# Patient Record
Sex: FEMALE | Race: WHITE | NOT HISPANIC OR LATINO | URBAN - METROPOLITAN AREA
[De-identification: names, ages, dates, MRNs, and addresses within clinical notes are randomized per-mention and may not be internally consistent; named-entity substitution may affect disease eponyms.]

---

## 2017-01-24 ENCOUNTER — HISTORICAL (OUTPATIENT)
Dept: ADMINISTRATIVE | Facility: HOSPITAL | Age: 62
End: 2017-01-24

## 2017-11-22 ENCOUNTER — HISTORICAL (OUTPATIENT)
Dept: RADIOLOGY | Facility: HOSPITAL | Age: 62
End: 2017-11-22

## 2017-11-28 ENCOUNTER — HISTORICAL (OUTPATIENT)
Dept: ADMINISTRATIVE | Facility: HOSPITAL | Age: 62
End: 2017-11-28

## 2017-12-08 ENCOUNTER — HISTORICAL (OUTPATIENT)
Dept: LAB | Facility: HOSPITAL | Age: 62
End: 2017-12-08

## 2017-12-13 ENCOUNTER — HISTORICAL (OUTPATIENT)
Dept: ADMINISTRATIVE | Facility: HOSPITAL | Age: 62
End: 2017-12-13

## 2018-07-05 ENCOUNTER — HISTORICAL (OUTPATIENT)
Dept: ADMINISTRATIVE | Facility: HOSPITAL | Age: 63
End: 2018-07-05

## 2019-01-29 ENCOUNTER — HISTORICAL (OUTPATIENT)
Dept: ADMINISTRATIVE | Facility: HOSPITAL | Age: 64
End: 2019-01-29

## 2019-01-29 LAB
ALBUMIN SERPL-MCNC: 3.6 GM/DL (ref 3.4–5)
ALBUMIN/GLOB SERPL: 1.2 {RATIO}
ALP SERPL-CCNC: 97 UNIT/L (ref 38–126)
ALT SERPL-CCNC: 19 UNIT/L (ref 12–78)
AST SERPL-CCNC: 12 UNIT/L (ref 15–37)
BILIRUB SERPL-MCNC: 0.6 MG/DL (ref 0.2–1)
BILIRUBIN DIRECT+TOT PNL SERPL-MCNC: 0.2 MG/DL (ref 0–0.2)
BILIRUBIN DIRECT+TOT PNL SERPL-MCNC: 0.4 MG/DL (ref 0–0.8)
BUN SERPL-MCNC: 15 MG/DL (ref 7–18)
CALCIUM SERPL-MCNC: 8.8 MG/DL (ref 8.5–10.1)
CHLORIDE SERPL-SCNC: 109 MMOL/L (ref 98–107)
CHOLEST SERPL-MCNC: 164 MG/DL (ref 0–200)
CHOLEST/HDLC SERPL: 2.5 {RATIO} (ref 0–4)
CO2 SERPL-SCNC: 27 MMOL/L (ref 21–32)
CREAT SERPL-MCNC: 0.72 MG/DL (ref 0.55–1.02)
ERYTHROCYTE [DISTWIDTH] IN BLOOD BY AUTOMATED COUNT: 12.8 % (ref 11.5–17)
EST. AVERAGE GLUCOSE BLD GHB EST-MCNC: 100 MG/DL
GLOBULIN SER-MCNC: 3.1 GM/DL (ref 2.4–3.5)
GLUCOSE SERPL-MCNC: 92 MG/DL (ref 74–106)
HBA1C MFR BLD: 5.1 % (ref 4.2–6.3)
HCT VFR BLD AUTO: 44.4 % (ref 37–47)
HDLC SERPL-MCNC: 65 MG/DL (ref 35–60)
HGB BLD-MCNC: 14.7 GM/DL (ref 12–16)
LDLC SERPL CALC-MCNC: 91 MG/DL (ref 0–129)
MCH RBC QN AUTO: 31.9 PG (ref 27–31)
MCHC RBC AUTO-ENTMCNC: 33.1 GM/DL (ref 33–36)
MCV RBC AUTO: 96.3 FL (ref 80–94)
PLATELET # BLD AUTO: 258 X10(3)/MCL (ref 130–400)
PMV BLD AUTO: 10.3 FL (ref 9.4–12.4)
POTASSIUM SERPL-SCNC: 4.3 MMOL/L (ref 3.5–5.1)
PROT SERPL-MCNC: 6.7 GM/DL (ref 6.4–8.2)
RBC # BLD AUTO: 4.61 X10(6)/MCL (ref 4.2–5.4)
SODIUM SERPL-SCNC: 142 MMOL/L (ref 136–145)
T4 FREE SERPL-MCNC: 0.87 NG/DL (ref 0.76–1.46)
TRIGL SERPL-MCNC: 42 MG/DL (ref 30–150)
TSH SERPL-ACNC: 1.71 MIU/L (ref 0.36–3.74)
VLDLC SERPL CALC-MCNC: 8 MG/DL
WBC # SPEC AUTO: 5 X10(3)/MCL (ref 4.5–11.5)

## 2022-01-29 ENCOUNTER — HISTORICAL (OUTPATIENT)
Dept: ADMINISTRATIVE | Facility: HOSPITAL | Age: 67
End: 2022-01-29

## 2022-04-07 ENCOUNTER — HISTORICAL (OUTPATIENT)
Dept: ADMINISTRATIVE | Facility: HOSPITAL | Age: 67
End: 2022-04-07
Payer: MEDICARE

## 2022-04-23 VITALS — BODY MASS INDEX: 21.97 KG/M2 | HEIGHT: 66 IN | WEIGHT: 136.69 LBS

## 2022-04-30 NOTE — OP NOTE
DATE OF SURGERY:    12/13/2017    SURGEON:  Shaq Matt IV, MD    PREOPERATIVE DIAGNOSIS:    1. Biliary dyskinesia with progressive symptoms and significant reproduction of symptoms on HIDA.    2. Rheumatoid arthritis on chronic immunosuppression.    POSTOPERATIVE DIAGNOSES:    1. Biliary dyskinesia with progressive symptoms and significant reproduction of symptoms on HIDA.    2. Rheumatoid arthritis on chronic immunosuppression.    PROCEDURE:  Laparoscopic cholecystectomy via three trocar technique.    ANESTHESIA:  General with local infiltration.    ASSISTANT:  Nisha Burt NP    ESTIMATED BLOOD LOSS:  Less than 3 ml.    SPECIMENS REMOVED:  Gallbladder.    PROCEDURE IN DETAIL:  After proper informed consent was obtained, the patient was transported to the Operating Room where she was placed supine on the Operating Room table.  After an adequate level of general endotracheal anesthesia was achieved, the patient's abdomen was prepped and draped in standard sterile surgical fashion.  The operation commenced with infiltration of local anesthetic within the periumbilical skin.  A small periumbilical incision was made and blunt dissection at the base of the umbilical ligament where small hernia was identified and dilated laterally, which allowed insertion of a blunt tip trocar.   The trocar was inserted and secured.  Abdominal insufflation was undertaken at 15 mm of mercury pressure without significant hemodynamic change.  A camera was inserted.  Abdominal contents were inspected and photographed.  No masses, lesions or pathology were demonstrated.  Following infiltration of local anesthetic and through a minor stab incision, two epigastric 5 mm trocars were placed.  Utilizing these trocars, the gallbladder was grasped and elevated.  The fibrofatty tissues were dissected from the triangle of Calot until the critical view was achieved.  Two clips were placed on the proximal cystic duct, one clip distally  and one clip on the proximal cystic artery.  The artery was coagulated and transected with Bovie electrocautery.  The cystic duct was transected sharply.  The gallbladder was then excised from its bed with low power Bovie electrocautery, withdrawn through the umbilical trocar site partially where it was aspirated of its bilious contents and fully removed.  Camera was reinserted.  Hemostasis was excellent.  There was no bile staining.  The epigastric trocars were removed.  The abdomen was desufflated through the umbilical trocar site, which was closed with 0 Vicryl figure-of-eight suture times one.  The wounds were irrigated with warm normal saline and closed with combination of 3-0 Vicryl and 4-0 Monocryl subcuticular closure.  Sterile dressings were applied.  The patient was awakened from her anesthesia and transported to the Recovery Room in good and stable condition.  All sponge, needle and instrument counts were correct at the end of the case.  There were no complications.        ______________________________  MD TELMA Sharma IV/ERNESTO  DD:  12/13/2017  Time:  08:56PM  DT:  12/14/2017  Time:  12:52PM  Job #:  [id number]    cc: MD Madhu Silva MD

## 2022-04-30 NOTE — OP NOTE
Patient:   Bridgette Wheatley            MRN: [id number]            FIN: [id number]               Age:   62 years     Sex:  Female     :  1955   Associated Diagnoses:   None   Author:   Ze Leigh MD          Preoperative Diagnosis: Nuclear sclerotic cataract [ Right] eye.    Postoperative Diagnosis: Same.    Anesthesia: Local    Procedure: Phacoemulsification of cataract with posterior chamber implant of the Right] eye.    This patient is a [  ] year old who was given a diagnosis of severe cataracts of both eyes with [ 20/40 ] vision [0d  ]. The risks and benefits of cataract surgery were explained; the patient was consented and desired to have the surgery done. The patient was given topical anesthesia using 1% Lidocaine Jelly and the patient was prepped and draped in sterile fashion.    The microscope was centered and focused in a temporal position and a super sharp blade was used to make a paracentesis in the corneal limbus. Dispersive Viscoelastic was then placed in the anterior chamber. A 2.4 mm keratome blade was used to enter the anterior chamber in a self-sealing type technique. The cystatome blade was then used to initiate a capsulorrhexis which was completed 360 degrees with Utrata forceps. BSS in an AC cannula was then used to perform hydrodissection and hydrodilineation. Phacoemulsification was then accomplished by creating a deep groove down the middle of the nucleus, then  it into 2 halves with the phacotip and the Wood chopper and finishing the removal with a stop and chop technique.  The cortex was then removed using the I and A unit. All the lens material was removed without any tears to the anterior or posterior capsule. Cohesive Viscoelastic was then injected to inflate the capsular bag. A foldable lens was then injected into the capsular bag. The lens was observed to be securely placed into the bag. The I and A was then used to remove the remaining viscoelastic. A  10-0 Biosorb incisional suture was not] placed. BSS through an A/C cannula was used to perform stromal hydration in the wound. BSS was also used again to reform the chamber and bring the eye to physiologic IOP.  The wound was checked for leaks and none were found. Copious Vigomox drop and 1-2 drops of, Prednisolone Acetate 1% were placed topically prior to removing the lid specular and drapes.  The drapes were then removed. The patient will be sent to recovery and instructed to use Vigamox, Ketorolac, and Predinsolone Acetate 1% three times a day as well as follow all instructions on the postoperative sheet given to them and explained after surgery. The patient will return to see Dr. Leigh at their scheduled appointment within 24-36 hours after surgery.      Ze Leigh M.D.              REVA/blu           [date / time]

## 2022-06-21 ENCOUNTER — OFFICE VISIT (OUTPATIENT)
Dept: ORTHOPEDICS | Facility: CLINIC | Age: 67
End: 2022-06-21
Payer: MEDICARE

## 2022-06-21 ENCOUNTER — HOSPITAL ENCOUNTER (OUTPATIENT)
Dept: RADIOLOGY | Facility: CLINIC | Age: 67
Discharge: HOME | End: 2022-06-21
Attending: SPECIALIST
Payer: MEDICARE

## 2022-06-21 VITALS
SYSTOLIC BLOOD PRESSURE: 91 MMHG | BODY MASS INDEX: 21.86 KG/M2 | HEART RATE: 76 BPM | WEIGHT: 136 LBS | DIASTOLIC BLOOD PRESSURE: 63 MMHG | HEIGHT: 66 IN

## 2022-06-21 DIAGNOSIS — M75.122 NONTRAUMATIC COMPLETE TEAR OF LEFT ROTATOR CUFF: ICD-10-CM

## 2022-06-21 DIAGNOSIS — M25.512 LEFT SHOULDER PAIN, UNSPECIFIED CHRONICITY: Primary | ICD-10-CM

## 2022-06-21 PROCEDURE — 20610 LARGE JOINT ASPIRATION/INJECTION: L SUBACROMIAL BURSA: ICD-10-PCS | Mod: LT,,, | Performed by: PHYSICIAN ASSISTANT

## 2022-06-21 PROCEDURE — 73030 X-RAY EXAM OF SHOULDER: CPT | Mod: LT,,, | Performed by: PHYSICIAN ASSISTANT

## 2022-06-21 PROCEDURE — 73030 XR SHOULDER COMPLETE 2 OR MORE VIEWS LEFT: ICD-10-PCS | Mod: LT,,, | Performed by: PHYSICIAN ASSISTANT

## 2022-06-21 PROCEDURE — 99203 PR OFFICE/OUTPT VISIT, NEW, LEVL III, 30-44 MIN: ICD-10-PCS | Mod: 25,,, | Performed by: PHYSICIAN ASSISTANT

## 2022-06-21 PROCEDURE — 99203 OFFICE O/P NEW LOW 30 MIN: CPT | Mod: 25,,, | Performed by: PHYSICIAN ASSISTANT

## 2022-06-21 PROCEDURE — 20610 DRAIN/INJ JOINT/BURSA W/O US: CPT | Mod: LT,,, | Performed by: PHYSICIAN ASSISTANT

## 2022-06-21 RX ORDER — LIDOCAINE HYDROCHLORIDE 20 MG/ML
5 INJECTION, SOLUTION EPIDURAL; INFILTRATION; INTRACAUDAL; PERINEURAL
Status: DISCONTINUED | OUTPATIENT
Start: 2022-06-21 | End: 2022-06-21 | Stop reason: HOSPADM

## 2022-06-21 RX ORDER — DILTIAZEM HYDROCHLORIDE 240 MG/1
CAPSULE, EXTENDED RELEASE ORAL
COMMUNITY

## 2022-06-21 RX ORDER — POTASSIUM CHLORIDE 750 MG/1
10 TABLET, EXTENDED RELEASE ORAL
COMMUNITY

## 2022-06-21 RX ORDER — LANOLIN ALCOHOL/MO/W.PET/CERES
400 CREAM (GRAM) TOPICAL DAILY
COMMUNITY

## 2022-06-21 RX ORDER — AMOXICILLIN 500 MG
2 CAPSULE ORAL
COMMUNITY

## 2022-06-21 RX ORDER — ROSUVASTATIN CALCIUM 5 MG/1
5 TABLET, COATED ORAL
COMMUNITY
Start: 2021-11-07

## 2022-06-21 RX ORDER — MULTIVITAMIN
1 TABLET ORAL
COMMUNITY

## 2022-06-21 RX ORDER — BETAMETHASONE SODIUM PHOSPHATE AND BETAMETHASONE ACETATE 3; 3 MG/ML; MG/ML
12 INJECTION, SUSPENSION INTRA-ARTICULAR; INTRALESIONAL; INTRAMUSCULAR; SOFT TISSUE
Status: DISCONTINUED | OUTPATIENT
Start: 2022-06-21 | End: 2022-06-21 | Stop reason: HOSPADM

## 2022-06-21 RX ORDER — ASPIRIN 81 MG/1
81 TABLET ORAL
COMMUNITY

## 2022-06-21 RX ORDER — FUROSEMIDE 40 MG/1
40 TABLET ORAL
COMMUNITY

## 2022-06-21 RX ORDER — ETANERCEPT 50 MG/ML
SOLUTION SUBCUTANEOUS
COMMUNITY

## 2022-06-21 RX ORDER — LORAZEPAM 1 MG/1
1 TABLET ORAL NIGHTLY
COMMUNITY
Start: 2022-04-29

## 2022-06-21 RX ADMIN — LIDOCAINE HYDROCHLORIDE 5 ML: 20 INJECTION, SOLUTION EPIDURAL; INFILTRATION; INTRACAUDAL; PERINEURAL at 01:06

## 2022-06-21 RX ADMIN — BETAMETHASONE SODIUM PHOSPHATE AND BETAMETHASONE ACETATE 12 MG: 3; 3 INJECTION, SUSPENSION INTRA-ARTICULAR; INTRALESIONAL; INTRAMUSCULAR; SOFT TISSUE at 01:06

## 2022-06-21 NOTE — PROGRESS NOTES
"Chief Complaint:   Chief Complaint   Patient presents with    Pain     left shoulder pain, pt said pain started a year ago, said she went to [facility] and got a cortisone inj, pt is not able to fully use arm without pain, pt said pain is like a sharp pain,       History of present illness:    This is a 67 y.o. year old female who complains of left shoudler pain  No injury or trauma  Pain is worse with working out    Review of Systems:    Constitution:   Denies chills, fever, and sweats.  HENT:   Denies headaches or blurry vision.  Cardiovascular:  Denies chest pain or irregular heart beat.  Respiratory:   Denies cough or shortness of breath.  Gastrointestinal:  Denies abdominal pain, nausea, or vomiting.  Musculoskeletal:   Denies muscle cramps.  Neurological:   Denies dizziness or focal weakness.  Psychiatric/Behavior: Normal mental status.  Hematology/Lymph:  Denies bleeding problem or easy bruising/bleeding.  Skin:    Denies rash or suspicious lesions.    Examination:    Vital Signs:    Vitals:    06/21/22 1313   BP: 91/63   Pulse: 76   Weight: 61.7 kg (136 lb)   Height: 5' 6" (1.676 m)       Body mass index is 21.95 kg/m².    Constitution:   Well-developed, well nourished patient in no acute distress.  Neurological:   Alert and oriented x 3 and cooperative to examination.     Psychiatric/Behavior: Normal mental status.  Respiratory:   No shortness of breath.  Eyes:    Extraoccular muscles intact  Skin:    No scars, rash or suspicious lesions.    Physical Exam:   right Shoulder:     No swelling, erythema or increased heat    Tender over deltoid, supraspinatus and infraspinatus    Tender over bicipital groove    positive drop arm test Positive Neer and Jennings impingement signs    positive weakness with rotator cuff resistance   Active shoulder abduction 90  Active forward elevation 170  Active internal rotation 40   Active external rotation 50     Radiographs of the shoulder taken in the office today " show    Imaging: X-rays ordered and images interpreted today personally by me of right shoulder        Assessment: Left shoulder pain, unspecified chronicity  -     X-Ray Shoulder 2 or More Views Left; Future; Expected date: 06/21/2022         Plan:  Right shoulder injection  NSAIDS  HEP          DISCLAIMER: This note may have been dictated using voice recognition software and may contain grammatical errors.     NOTE: Consult report sent to referring provider via [employer] EMR.

## 2022-06-21 NOTE — PROCEDURES
Large Joint Aspiration/Injection: L subacromial bursa    Date/Time: 6/21/2022 1:15 PM  Performed by: Clay Lima PA-C  Authorized by: Clay Lima PA-C     Consent Done?:  Yes (Verbal)  Indications:  Pain  Timeout: prior to procedure the correct patient, procedure, and site was verified      Local anesthesia used?: Yes      Details:  Needle Size:  25 G  Ultrasonic Guidance for needle placement?: No    Approach:  Posterior  Location:  Shoulder  Site:  L subacromial bursa  Medications:  5 mL LIDOcaine (PF) 20 mg/mL (2%) 20 mg/mL (2 %); 12 mg betamethasone acetate-betamethasone sodium phosphate 6 mg/mL  Patient tolerance:  Patient tolerated the procedure well with no immediate complications

## 2022-09-09 ENCOUNTER — OFFICE VISIT (OUTPATIENT)
Dept: URGENT CARE | Facility: CLINIC | Age: 67
End: 2022-09-09
Payer: MEDICARE

## 2022-09-09 VITALS
WEIGHT: 137 LBS | DIASTOLIC BLOOD PRESSURE: 81 MMHG | RESPIRATION RATE: 20 BRPM | SYSTOLIC BLOOD PRESSURE: 130 MMHG | HEART RATE: 91 BPM | TEMPERATURE: 99 F | OXYGEN SATURATION: 98 % | HEIGHT: 66 IN | BODY MASS INDEX: 22.02 KG/M2

## 2022-09-09 DIAGNOSIS — R00.2 PALPITATIONS: Primary | ICD-10-CM

## 2022-09-09 DIAGNOSIS — Z20.822 CONTACT WITH AND (SUSPECTED) EXPOSURE TO COVID-19: ICD-10-CM

## 2022-09-09 LAB
CTP QC/QA: YES
EKG 12-LEAD: NORMAL
PR INTERVAL: NORMAL
PRT AXES: NORMAL
QRS DURATION: NORMAL
QT/QTC: NORMAL
SARS-COV-2 RDRP RESP QL NAA+PROBE: NEGATIVE
VENTRICULAR RATE: NORMAL

## 2022-09-09 PROCEDURE — U0002: ICD-10-PCS | Mod: QW,CR,, | Performed by: FAMILY MEDICINE

## 2022-09-09 PROCEDURE — 93000 POCT EKG 12-LEAD: ICD-10-PCS | Mod: S$PBB,,, | Performed by: FAMILY MEDICINE

## 2022-09-09 PROCEDURE — U0002 COVID-19 LAB TEST NON-CDC: HCPCS | Mod: QW,CR,, | Performed by: FAMILY MEDICINE

## 2022-09-09 PROCEDURE — 99214 OFFICE O/P EST MOD 30 MIN: CPT | Mod: S$PBB,,, | Performed by: FAMILY MEDICINE

## 2022-09-09 PROCEDURE — 99214 PR OFFICE/OUTPT VISIT, EST, LEVL IV, 30-39 MIN: ICD-10-PCS | Mod: S$PBB,,, | Performed by: FAMILY MEDICINE

## 2022-09-09 PROCEDURE — 93000 ELECTROCARDIOGRAM COMPLETE: CPT | Mod: S$PBB,,, | Performed by: FAMILY MEDICINE

## 2022-09-09 NOTE — PROGRESS NOTES
"Subjective:       Patient ID: Bridgette Wheatley is a 67 y.o. female.    Vitals:  height is 5' 6" (1.676 m) and weight is 62.1 kg (137 lb). Her oral temperature is 99.1 °F (37.3 °C). Her blood pressure is 130/81 and her pulse is 91. Her respiration is 20 and oxygen saturation is 98%.     Chief Complaint: Mouth irritation (Mouth irritation since yesterday. )    1 day of throat irritation with concern of thrush.  No fever, no known exposures. Wanted to get COVID tested as her  is in a long term care facility. Pt also mentions that she has palpitations that began last night, woke her up from sleep.  No CP, no L arm or neck pain, no SOB or WIGGINS. No med changes.  Similar to anxious episodes but never had at bedtime when she is woken up.       Constitution: Negative for fever.   Cardiovascular:  Positive for palpitations. Negative for sob on exertion and passing out.   Eyes:  Negative for eye pain.   Respiratory:  Negative for chest tightness, cough and shortness of breath.    Genitourinary:  Negative for urgency and flank pain.     Objective:      Physical Exam   Constitutional: She is oriented to person, place, and time.   HENT:   Head: Normocephalic.   Ears:   Right Ear: Tympanic membrane normal.   Left Ear: Tympanic membrane normal.   Nose: Nose normal.   Mouth/Throat: Mucous membranes are moist. No oropharyngeal exudate or posterior oropharyngeal erythema.      Comments: Pos triangular abrasion to the L cheek  Eyes: Pupils are equal, round, and reactive to light.   Cardiovascular: Normal rate, regular rhythm, normal heart sounds and normal pulses.   Pulmonary/Chest: Effort normal and breath sounds normal.   Abdominal: Normal appearance. flat abdomen   Neurological: no focal deficit. She is alert and oriented to person, place, and time.   Skin: Skin is warm. Capillary refill takes less than 2 seconds.   Psychiatric: Her behavior is normal. Mood normal.   Vitals reviewed.      Assessment:       1. Palpitations  "   2. Contact with and (suspected) exposure to covid-19            Plan:         Palpitations  -     EKG 12-lead; Future  -     POCT EKG 12-LEAD (NOT FOR [facility] USE)    Contact with and (suspected) exposure to covid-19  -     POCT COVID-19 Rapid Screening          EKG NSR, no ST changes, no ectopy.

## 2022-12-13 DIAGNOSIS — M25.552 LEFT HIP PAIN: Primary | ICD-10-CM

## 2022-12-27 ENCOUNTER — OFFICE VISIT (OUTPATIENT)
Dept: ORTHOPEDICS | Facility: CLINIC | Age: 67
End: 2022-12-27
Payer: MEDICARE

## 2022-12-27 ENCOUNTER — HOSPITAL ENCOUNTER (OUTPATIENT)
Dept: RADIOLOGY | Facility: CLINIC | Age: 67
Discharge: HOME | End: 2022-12-27
Attending: ORTHOPAEDIC SURGERY
Payer: MEDICARE

## 2022-12-27 DIAGNOSIS — M25.552 LEFT HIP PAIN: ICD-10-CM

## 2022-12-27 DIAGNOSIS — M54.16 LUMBAR RADICULOPATHY, CHRONIC: ICD-10-CM

## 2022-12-27 DIAGNOSIS — M70.62 TROCHANTERIC BURSITIS OF LEFT HIP: ICD-10-CM

## 2022-12-27 DIAGNOSIS — M25.552 LEFT HIP PAIN: Primary | ICD-10-CM

## 2022-12-27 DIAGNOSIS — M24.152 DEGENERATIVE TEAR OF ACETABULAR LABRUM OF LEFT HIP: ICD-10-CM

## 2022-12-27 PROCEDURE — 99213 OFFICE O/P EST LOW 20 MIN: CPT | Mod: ,,, | Performed by: ORTHOPAEDIC SURGERY

## 2022-12-27 PROCEDURE — 73502 X-RAY EXAM HIP UNI 2-3 VIEWS: CPT | Mod: LT,,, | Performed by: ORTHOPAEDIC SURGERY

## 2022-12-27 PROCEDURE — 99213 PR OFFICE/OUTPT VISIT, EST, LEVL III, 20-29 MIN: ICD-10-PCS | Mod: ,,, | Performed by: ORTHOPAEDIC SURGERY

## 2022-12-27 PROCEDURE — 73502 XR HIP WITH PELVIS WHEN PERFORMED, 2 OR 3 VIEWS LEFT: ICD-10-PCS | Mod: LT,,, | Performed by: ORTHOPAEDIC SURGERY

## 2022-12-27 NOTE — PROGRESS NOTES
Past Medical History:   Diagnosis Date    Anxiety     Arthritis     Hyperlipidemia        Past Surgical History:   Procedure Laterality Date    CHOLECYSTECTOMY         Current Outpatient Medications   Medication Sig    aspirin (ECOTRIN) 81 MG EC tablet Take 81 mg by mouth.    diltiaZEM (TIAZAC) 240 MG Cs24 diltiazem  mg capsule,extended release 24 hr    etanercept (ENBREL SURECLICK) 50 mg/mL (1 mL) Enbrel SureClick 50 mg/mL (1 mL) subcutaneous pen injector    furosemide (LASIX) 40 MG tablet Take 40 mg by mouth.    LORazepam (ATIVAN) 1 MG tablet Take 1 mg by mouth nightly.    magnesium oxide (MAG-OX) 400 mg (241.3 mg magnesium) tablet Take 400 mg by mouth once daily.    multivitamin (THERAGRAN) per tablet Take 1 tablet by mouth.    mv-min-vit C-elderber-herb 124 1,000 mg-50 mg-35.5 mg TbEF Take by mouth.    omega-3 fatty acids/fish oil (FISH OIL-OMEGA-3 FATTY ACIDS) 300-1,000 mg capsule Take 2 g by mouth.    potassium chloride (KLOR-CON) 10 MEQ TbSR Take 10 mEq by mouth.    rosuvastatin (CRESTOR) 5 MG tablet Take 5 mg by mouth.     No current facility-administered medications for this visit.       Review of patient's allergies indicates:  No Known Allergies    Family History   Problem Relation Age of Onset    Hypertension Mother     Hypertension Father        Social History     Socioeconomic History    Marital status:    Tobacco Use    Smoking status: Never    Smokeless tobacco: Never   Substance and Sexual Activity    Alcohol use: Yes     Comment: Wine socially    Drug use: Never       Chief Complaint:   Chief Complaint   Patient presents with    Hip Pain     Pt reports 2 tears in her Lt hip that she found out about month ago. Pain feels like a bite in her buttock area and also a shooting that radiates down her legs. Pt has been applying heating pads which is not helping at all.       History of present illness:  67-year-old female presents for evaluation of left hip pain.  Patient describes some mild  groin pain.  Some lateral pain as well as some severe buttock pain.  She is been seen by rheumatology in the past.  She was diagnosed with a hip labral tear as well as some low back pathology.  She is significant difficulty when she ambulates.  Has significant difficulty when she lays down at night mainly with buttock pain.  Patient has not taken anti-inflammatories.  She did get 2 injections into her trochanteric bursa with good relief of her lateral hip pain.  No physical therapy.  No other interventions thus far      Review of Systems:    Constitution: Negative for chills, fever, and sweats.  Negative for unexplained weight loss.    HENT:  Negative for headaches and blurry vision.    Cardiovascular:Negative for chest pain or irregular heart beat. Negative for hypertension.    Respiratory:  Negative for cough and shortness of breath.    Gastrointestinal: Negative for abdominal pain, heartburn, melena, nausea, and vomitting.    Genitourinary:  Negative bladder incontinence and dysuria.    Musculoskeletal:  See HPI    Neurological: Negative for numbness.    Psychiatric/Behavioral: Negative for depression.  The patient is not nervous/anxious.      Endocrine: Negative for polyuria    Hematologic/Lymphatic: Negative for bleeding problem.  Does not bruise/bleed easily.    Skin: Negative for poor would healing and rash      Physical Examination:    Vital Signs:  There were no vitals filed for this visit.    There is no height or weight on file to calculate BMI.    General: No acute distress, alert and oriented, healthy appearing    HEENT: Head is atraumatic, mucous membranes are moist    Neck: Supples, no JVD    Cardiovascular: Palpable dorsalis pedis and posterior tibial pulses, regular rate and rhythm to those pulses    Lungs: Breathing non-labored    Skin: no rashes appreciated    Neurologic: Can flex and extend knees, ankles, and toes. Sensation is grossly intact    Left hip:  Range of motion left hip without  significant pain.  Negative Stinchfield.  Internal external rotation not cause any groin pain.  No signs of impingement.  Does have a negative straight leg raise today.    X-rays:  Three views of left hip reviewed today.  Patient with well-maintained joint space of the left hip.  No displaced fracture noted.  MRI lumbar spine reviewed shows multiple levels of foraminal stenosis.     Assessment::  Left hip labral tear  Lumbar radiculopathy  Trochanteric bursitis    Plan:  Appears that a lot of her symptoms are coming from her lumbar spine.  We will get her into physical therapy for her low back as well as her left hip.  She does have a labral tear which is fairly common in her age and her exam today is not consistent with labral pathology although she does have some complaints of some groin pain.  If her pain is persistent and therapy fails to relieve her symptoms, we could try a guided injection into her left hip.    This note was created using [employer] voice recognition software that occasionally misinterpreted phrases or words.    Consult note is delivered via [employer] messaging service.

## 2022-12-29 ENCOUNTER — TELEPHONE (OUTPATIENT)
Dept: ORTHOPEDICS | Facility: CLINIC | Age: 67
End: 2022-12-29
Payer: MEDICARE

## 2022-12-29 DIAGNOSIS — M24.152 DEGENERATIVE TEAR OF ACETABULAR LABRUM OF LEFT HIP: Primary | ICD-10-CM

## 2022-12-29 DIAGNOSIS — M25.552 LEFT HIP PAIN: ICD-10-CM

## 2022-12-29 NOTE — TELEPHONE ENCOUNTER
Patient called asking about a medication that Dr Soliman was to send to her pharmacy to help with pain until she starts therapy.  When speaking with her she stated that it was a old medication that would not mess with her stomach.    Would you know what he as talking about?  She also stated she has tried mobic that was not really effective

## 2023-01-03 RX ORDER — DICLOFENAC SODIUM 75 MG/1
75 TABLET, DELAYED RELEASE ORAL 2 TIMES DAILY
Qty: 60 TABLET | Refills: 0 | Status: SHIPPED | OUTPATIENT
Start: 2023-01-03 | End: 2023-02-02

## 2023-01-03 NOTE — TELEPHONE ENCOUNTER
Spoke with patient she informed me she knows that it was not celebrex because she used that in the past.  She wants to try to do the diclofenac

## 2023-01-13 ENCOUNTER — TELEPHONE (OUTPATIENT)
Dept: ORTHOPEDICS | Facility: CLINIC | Age: 68
End: 2023-01-13
Payer: MEDICARE

## 2023-01-13 NOTE — TELEPHONE ENCOUNTER
Patient called requesting Percocet for what she said a spine fracture.  I checked in her chart and I don't see in your notes that she has a spine fracture.  She is in [place] for pre [place]ympics and doing a lot of walking.  She is requesting Percocet.  Please advise.

## 2023-01-18 NOTE — TELEPHONE ENCOUNTER
Left a message that Diclofenac was sent to her pharmacy ([facility]) and she could get it transferred to where she is.  Asked her to call if she needs anything else.

## 2023-01-25 DIAGNOSIS — M24.152 DEGENERATIVE TEAR OF ACETABULAR LABRUM OF LEFT HIP: ICD-10-CM

## 2023-01-25 DIAGNOSIS — M54.16 LUMBAR RADICULOPATHY, CHRONIC: Primary | ICD-10-CM

## 2025-02-18 ENCOUNTER — LAB VISIT (OUTPATIENT)
Dept: LAB | Facility: HOSPITAL | Age: 70
End: 2025-02-18
Attending: INTERNAL MEDICINE
Payer: MEDICARE

## 2025-02-18 DIAGNOSIS — Z82.49 FAMILY HISTORY OF ISCHEMIC HEART DISEASE: ICD-10-CM

## 2025-02-18 DIAGNOSIS — R06.09 DYSPNEA ON EXERTION: Primary | ICD-10-CM

## 2025-02-18 LAB — POTASSIUM SERPL-SCNC: 4.1 MMOL/L (ref 3.5–5.1)

## 2025-02-18 PROCEDURE — 84132 ASSAY OF SERUM POTASSIUM: CPT

## 2025-02-18 PROCEDURE — 36415 COLL VENOUS BLD VENIPUNCTURE: CPT
